# Patient Record
Sex: MALE | Race: OTHER | NOT HISPANIC OR LATINO | URBAN - METROPOLITAN AREA
[De-identification: names, ages, dates, MRNs, and addresses within clinical notes are randomized per-mention and may not be internally consistent; named-entity substitution may affect disease eponyms.]

---

## 2020-10-29 ENCOUNTER — INPATIENT (INPATIENT)
Facility: HOSPITAL | Age: 35
LOS: 0 days | Discharge: HOME | DRG: 343 | End: 2020-10-30
Attending: SURGERY | Admitting: SURGERY
Payer: COMMERCIAL

## 2020-10-29 VITALS
DIASTOLIC BLOOD PRESSURE: 77 MMHG | TEMPERATURE: 100 F | OXYGEN SATURATION: 99 % | SYSTOLIC BLOOD PRESSURE: 120 MMHG | RESPIRATION RATE: 18 BRPM | HEIGHT: 72.44 IN | WEIGHT: 216.05 LBS | HEART RATE: 108 BPM

## 2020-10-29 LAB
ALBUMIN SERPL ELPH-MCNC: 4 G/DL — SIGNIFICANT CHANGE UP (ref 3.3–5)
ALP SERPL-CCNC: 69 U/L — SIGNIFICANT CHANGE UP (ref 40–120)
ALT FLD-CCNC: 25 U/L — SIGNIFICANT CHANGE UP (ref 10–45)
ANION GAP SERPL CALC-SCNC: 12 MMOL/L — SIGNIFICANT CHANGE UP (ref 5–17)
APPEARANCE UR: CLEAR — SIGNIFICANT CHANGE UP
APTT BLD: 35.3 SEC — SIGNIFICANT CHANGE UP (ref 27.5–35.5)
AST SERPL-CCNC: 21 U/L — SIGNIFICANT CHANGE UP (ref 10–40)
BACTERIA # UR AUTO: PRESENT /HPF
BASOPHILS # BLD AUTO: 0.06 K/UL — SIGNIFICANT CHANGE UP (ref 0–0.2)
BASOPHILS NFR BLD AUTO: 0.4 % — SIGNIFICANT CHANGE UP (ref 0–2)
BILIRUB SERPL-MCNC: 1 MG/DL — SIGNIFICANT CHANGE UP (ref 0.2–1.2)
BILIRUB UR-MCNC: NEGATIVE — SIGNIFICANT CHANGE UP
BLD GP AB SCN SERPL QL: NEGATIVE — SIGNIFICANT CHANGE UP
BLD GP AB SCN SERPL QL: NEGATIVE — SIGNIFICANT CHANGE UP
BUN SERPL-MCNC: 18 MG/DL — SIGNIFICANT CHANGE UP (ref 7–23)
CALCIUM SERPL-MCNC: 9.1 MG/DL — SIGNIFICANT CHANGE UP (ref 8.4–10.5)
CHLORIDE SERPL-SCNC: 99 MMOL/L — SIGNIFICANT CHANGE UP (ref 96–108)
CO2 SERPL-SCNC: 25 MMOL/L — SIGNIFICANT CHANGE UP (ref 22–31)
COLOR SPEC: YELLOW — SIGNIFICANT CHANGE UP
COMMENT - URINE: SIGNIFICANT CHANGE UP
CREAT SERPL-MCNC: 0.91 MG/DL — SIGNIFICANT CHANGE UP (ref 0.5–1.3)
DIFF PNL FLD: ABNORMAL
EOSINOPHIL # BLD AUTO: 0.12 K/UL — SIGNIFICANT CHANGE UP (ref 0–0.5)
EOSINOPHIL NFR BLD AUTO: 0.7 % — SIGNIFICANT CHANGE UP (ref 0–6)
EPI CELLS # UR: SIGNIFICANT CHANGE UP /HPF (ref 0–5)
GLUCOSE SERPL-MCNC: 106 MG/DL — HIGH (ref 70–99)
GLUCOSE UR QL: NEGATIVE — SIGNIFICANT CHANGE UP
HCT VFR BLD CALC: 49.6 % — SIGNIFICANT CHANGE UP (ref 39–50)
HGB BLD-MCNC: 16.6 G/DL — SIGNIFICANT CHANGE UP (ref 13–17)
IMM GRANULOCYTES NFR BLD AUTO: 0.6 % — SIGNIFICANT CHANGE UP (ref 0–1.5)
INR BLD: 1.24 — HIGH (ref 0.88–1.16)
KETONES UR-MCNC: NEGATIVE — SIGNIFICANT CHANGE UP
LACTATE SERPL-SCNC: 0.9 MMOL/L — SIGNIFICANT CHANGE UP (ref 0.5–2)
LEUKOCYTE ESTERASE UR-ACNC: NEGATIVE — SIGNIFICANT CHANGE UP
LIDOCAIN IGE QN: 75 U/L — HIGH (ref 7–60)
LYMPHOCYTES # BLD AUTO: 1.93 K/UL — SIGNIFICANT CHANGE UP (ref 1–3.3)
LYMPHOCYTES # BLD AUTO: 11.9 % — LOW (ref 13–44)
MCHC RBC-ENTMCNC: 29.2 PG — SIGNIFICANT CHANGE UP (ref 27–34)
MCHC RBC-ENTMCNC: 33.5 GM/DL — SIGNIFICANT CHANGE UP (ref 32–36)
MCV RBC AUTO: 87.2 FL — SIGNIFICANT CHANGE UP (ref 80–100)
MONOCYTES # BLD AUTO: 1.36 K/UL — HIGH (ref 0–0.9)
MONOCYTES NFR BLD AUTO: 8.4 % — SIGNIFICANT CHANGE UP (ref 2–14)
NEUTROPHILS # BLD AUTO: 12.66 K/UL — HIGH (ref 1.8–7.4)
NEUTROPHILS NFR BLD AUTO: 78 % — HIGH (ref 43–77)
NITRITE UR-MCNC: NEGATIVE — SIGNIFICANT CHANGE UP
NRBC # BLD: 0 /100 WBCS — SIGNIFICANT CHANGE UP (ref 0–0)
PH UR: 6 — SIGNIFICANT CHANGE UP (ref 5–8)
PLATELET # BLD AUTO: 229 K/UL — SIGNIFICANT CHANGE UP (ref 150–400)
POTASSIUM SERPL-MCNC: 4.2 MMOL/L — SIGNIFICANT CHANGE UP (ref 3.5–5.3)
POTASSIUM SERPL-SCNC: 4.2 MMOL/L — SIGNIFICANT CHANGE UP (ref 3.5–5.3)
PROT SERPL-MCNC: 7.5 G/DL — SIGNIFICANT CHANGE UP (ref 6–8.3)
PROT UR-MCNC: NEGATIVE MG/DL — SIGNIFICANT CHANGE UP
PROTHROM AB SERPL-ACNC: 14.7 SEC — HIGH (ref 10.6–13.6)
RBC # BLD: 5.69 M/UL — SIGNIFICANT CHANGE UP (ref 4.2–5.8)
RBC # FLD: 12.6 % — SIGNIFICANT CHANGE UP (ref 10.3–14.5)
RBC CASTS # UR COMP ASSIST: < 5 /HPF — SIGNIFICANT CHANGE UP
RH IG SCN BLD-IMP: POSITIVE — SIGNIFICANT CHANGE UP
RH IG SCN BLD-IMP: POSITIVE — SIGNIFICANT CHANGE UP
SARS-COV-2 RNA SPEC QL NAA+PROBE: SIGNIFICANT CHANGE UP
SODIUM SERPL-SCNC: 136 MMOL/L — SIGNIFICANT CHANGE UP (ref 135–145)
SP GR SPEC: >=1.03 — SIGNIFICANT CHANGE UP (ref 1–1.03)
UROBILINOGEN FLD QL: 0.2 E.U./DL — SIGNIFICANT CHANGE UP
WBC # BLD: 16.22 K/UL — HIGH (ref 3.8–10.5)
WBC # FLD AUTO: 16.22 K/UL — HIGH (ref 3.8–10.5)
WBC UR QL: < 5 /HPF — SIGNIFICANT CHANGE UP

## 2020-10-29 PROCEDURE — 74177 CT ABD & PELVIS W/CONTRAST: CPT | Mod: 26

## 2020-10-29 PROCEDURE — 88304 TISSUE EXAM BY PATHOLOGIST: CPT | Mod: 26

## 2020-10-29 PROCEDURE — 99285 EMERGENCY DEPT VISIT HI MDM: CPT

## 2020-10-29 PROCEDURE — 93010 ELECTROCARDIOGRAM REPORT: CPT

## 2020-10-29 RX ORDER — SODIUM CHLORIDE 9 MG/ML
1000 INJECTION INTRAMUSCULAR; INTRAVENOUS; SUBCUTANEOUS ONCE
Refills: 0 | Status: COMPLETED | OUTPATIENT
Start: 2020-10-29 | End: 2020-10-29

## 2020-10-29 RX ORDER — ONDANSETRON 8 MG/1
4 TABLET, FILM COATED ORAL ONCE
Refills: 0 | Status: COMPLETED | OUTPATIENT
Start: 2020-10-29 | End: 2020-10-29

## 2020-10-29 RX ORDER — HYDROMORPHONE HYDROCHLORIDE 2 MG/ML
0.5 INJECTION INTRAMUSCULAR; INTRAVENOUS; SUBCUTANEOUS EVERY 4 HOURS
Refills: 0 | Status: DISCONTINUED | OUTPATIENT
Start: 2020-10-29 | End: 2020-10-29

## 2020-10-29 RX ORDER — METRONIDAZOLE 500 MG
500 TABLET ORAL EVERY 8 HOURS
Refills: 0 | Status: CANCELLED | OUTPATIENT
Start: 2020-10-30 | End: 2020-10-29

## 2020-10-29 RX ORDER — MORPHINE SULFATE 50 MG/1
4 CAPSULE, EXTENDED RELEASE ORAL ONCE
Refills: 0 | Status: DISCONTINUED | OUTPATIENT
Start: 2020-10-29 | End: 2020-10-29

## 2020-10-29 RX ORDER — CEFTRIAXONE 500 MG/1
1000 INJECTION, POWDER, FOR SOLUTION INTRAMUSCULAR; INTRAVENOUS EVERY 24 HOURS
Refills: 0 | Status: CANCELLED | OUTPATIENT
Start: 2020-10-30 | End: 2020-10-29

## 2020-10-29 RX ORDER — ONDANSETRON 8 MG/1
4 TABLET, FILM COATED ORAL EVERY 6 HOURS
Refills: 0 | Status: DISCONTINUED | OUTPATIENT
Start: 2020-10-29 | End: 2020-10-29

## 2020-10-29 RX ORDER — HYDROMORPHONE HYDROCHLORIDE 2 MG/ML
0.25 INJECTION INTRAMUSCULAR; INTRAVENOUS; SUBCUTANEOUS EVERY 4 HOURS
Refills: 0 | Status: DISCONTINUED | OUTPATIENT
Start: 2020-10-29 | End: 2020-10-29

## 2020-10-29 RX ORDER — SODIUM CHLORIDE 9 MG/ML
1000 INJECTION, SOLUTION INTRAVENOUS
Refills: 0 | Status: DISCONTINUED | OUTPATIENT
Start: 2020-10-29 | End: 2020-10-29

## 2020-10-29 RX ORDER — IOHEXOL 300 MG/ML
30 INJECTION, SOLUTION INTRAVENOUS ONCE
Refills: 0 | Status: COMPLETED | OUTPATIENT
Start: 2020-10-29 | End: 2020-10-29

## 2020-10-29 RX ORDER — HEPARIN SODIUM 5000 [USP'U]/ML
5000 INJECTION INTRAVENOUS; SUBCUTANEOUS EVERY 8 HOURS
Refills: 0 | Status: CANCELLED | OUTPATIENT
Start: 2020-10-30 | End: 2020-10-29

## 2020-10-29 RX ORDER — PIPERACILLIN AND TAZOBACTAM 4; .5 G/20ML; G/20ML
3.38 INJECTION, POWDER, LYOPHILIZED, FOR SOLUTION INTRAVENOUS ONCE
Refills: 0 | Status: COMPLETED | OUTPATIENT
Start: 2020-10-29 | End: 2020-10-29

## 2020-10-29 RX ADMIN — PIPERACILLIN AND TAZOBACTAM 200 GRAM(S): 4; .5 INJECTION, POWDER, LYOPHILIZED, FOR SOLUTION INTRAVENOUS at 19:19

## 2020-10-29 RX ADMIN — ONDANSETRON 4 MILLIGRAM(S): 8 TABLET, FILM COATED ORAL at 18:34

## 2020-10-29 RX ADMIN — SODIUM CHLORIDE 2000 MILLILITER(S): 9 INJECTION INTRAMUSCULAR; INTRAVENOUS; SUBCUTANEOUS at 18:34

## 2020-10-29 RX ADMIN — MORPHINE SULFATE 4 MILLIGRAM(S): 50 CAPSULE, EXTENDED RELEASE ORAL at 19:30

## 2020-10-29 RX ADMIN — SODIUM CHLORIDE 1000 MILLILITER(S): 9 INJECTION INTRAMUSCULAR; INTRAVENOUS; SUBCUTANEOUS at 20:42

## 2020-10-29 RX ADMIN — IOHEXOL 30 MILLILITER(S): 300 INJECTION, SOLUTION INTRAVENOUS at 18:34

## 2020-10-29 RX ADMIN — MORPHINE SULFATE 4 MILLIGRAM(S): 50 CAPSULE, EXTENDED RELEASE ORAL at 18:33

## 2020-10-29 NOTE — H&P ADULT - ATTENDING COMMENTS
Patient seen and examined, CT reviewed.  Appendicitis.  OR for lap/open appendectomy. Risks reviewed. Questions answered. He agrees to proceed.  Received Zosyn in ED

## 2020-10-29 NOTE — H&P ADULT - NSHPLABSRESULTS_GEN_ALL_CORE
10-29    136  |  99  |  18  ----------------------------<  106<H>  4.2   |  25  |  0.91    Ca    9.1      29 Oct 2020 18:24    TPro  7.5  /  Alb  4.0  /  TBili  1.0  /  DBili  x   /  AST  21  /  ALT  25  /  AlkPhos  69  10-29                        16.6   16.22 )-----------( 229      ( 29 Oct 2020 18:24 )             49.6   CT Abdomen and Pelvis w/ Oral Cont and w/ IV Cont:   ******PRELIMINARY REPORT******    ******PRELIMINARY REPORT******            EXAM:  CT ABDOMEN AND PELVIS OC IC                          PROCEDURE DATE:  10/29/2020    ******PRELIMINARY REPORT******    ******PRELIMINARY REPORT******              INTERPRETATION:  CT of the ABDOMEN and PELVIS with intravenous contrast dated 10/29/2020 8:17 PM    INDICATION: Right lower quadrant abdominal pain. Rule out appendicitis.    TECHNIQUE: CT of the abdomen and pelvis with intravenous and oral contrast. Axial, sagittal, and coronal images were obtained and reviewed. 95 cc Optiray 350 intravenous contrast was administered; 5 cc was discarded.    COMPARISON: None.    FINDINGS:    Lower chest: Mild bibasilar atelectasis.    Liver: Smooth contour. No focal lesion.    Biliary system: Gallbladder is normal in size. No calcified gallstones. No biliary ductal dilatation.    Pancreas: Unremarkable.    Spleen: Unremarkable.    Adrenal glands: Unremarkable.    Kidneys: Symmetric parenchymal enhancement. No renal mass. No hydronephrosis. No renal or ureteral stone.    Bowel/Peritoneum: Enteric contrast reaches the level of the cecum. The appendix is dilated measuring 1.5 with prominent periappendiceal and pericecal fat stranding. An appendicolith measuring 4 mm is present within the proximal appendix. No signs of perforation. There is a 2.3 x 2.3 cm fluid collection with a few foci of air within the cecum adjacent to the base of the appendix, which could represent a contained intraluminal abscess. Normal bowel caliber without evidence of obstruction. No appreciable wall thickening. Normal appendix. No extraluminal gas or ascites. Small fat-containing umbilical hernia.    Pelvic organs: Unremarkable.    Lymph nodes: No lymphadenopathy.    Vascular: Normal caliber aorta.    Bones/Soft tissues: There is fusion of the T10 and T11 vertebral bodies.      IMPRESSION:    Acute appendicitis. 2.3 cm air-fluid collection within the cecum near the base of the appendix could represent a contained intraluminal abscess. No extraluminal gas visualized to suggest perforation.

## 2020-10-29 NOTE — ED ADULT NURSE REASSESSMENT NOTE - NS ED NURSE REASSESS COMMENT FT1
Report given to surgery by SARAH Ruvalcaba.  GI Surgery at bedside with telephonic  for consent and evaluation.  Pt is alert and oriented x3.

## 2020-10-29 NOTE — H&P ADULT - NSHPPHYSICALEXAM_GEN_ALL_CORE
T(C): 37.2 (10-29-20 @ 19:20), Max: 37.9 (10-29-20 @ 17:46)  HR: 93 (10-29-20 @ 19:20) (93 - 108)  BP: 123/86 (10-29-20 @ 19:20) (120/77 - 123/86)  RR: 18 (10-29-20 @ 19:20) (18 - 18)  SpO2: 99% (10-29-20 @ 19:20) (99% - 99%)    GENERAL: NAD, Resting comfortably in bed, awake, opens eyes spontaneously  HEENT: NCAT, MMM, Normal conjunctiva, PERRL  RESP: Nonlabored breathing, No respiratory distress  CARD: Normal rate, Normal peripheral perfusion  GI: Soft, moderately distended, mod TTP RLQ, -Rovsing, No guarding, No rebound tenderness  EXTREM: WWP, No edema, No gross deformity of extremities  SKIN: No rashes, no lesions  NEURO: AAOx3, No focal motor or sensory deficits  PSYCH: Affect and characteristics of appearance, verbalizations, and behaviors are appropriate

## 2020-10-29 NOTE — ED PROVIDER NOTE - CHPI ED SYMPTOMS NEG
no vomiting/no URI symptoms, no back pain, no change in bowel movement/no fever/no nausea/no chills/no diarrhea

## 2020-10-29 NOTE — H&P ADULT - HISTORY OF PRESENT ILLNESS
35M (Kiswahili speaking) no PMH/PSHx (does not have PCP) presents with abd pain x 3 days. Pt describes pain as sudden onset, progressively worsening, constant, sharp, severe, located in RLQ, non-radiating, no migration, moderately improved s/p 4 mg morphine in ED. Patient did not take OTC pain meds prior to arrival, and decided to come to ED given persistence of pain. Patient denies associated symptoms, including recent fevers/chills, nausea/vomiting, PO intolerance, diarrhea, constipation, hematochezia/melena, CP, SOB, dysuria, sick contacts. Last BM this AM was normal in color/consistency. Last meal was at noon (chocolate arepa), tolerated well. Pt has no family history of IBD or GI malignancy, and has never had a colonoscopy. In ED afebrile, tachycardic 108, /77, O2 99% on RA. WBC 16.2 (79% PMNs), INR 1.24, lactate 0.9, lipase 75, UA neg. CT revealed acute appendicitis dilated 1.5 cm with prominent periappendiceal and pericecal fat stranding, 4mm appendicolith, and no signs of perforation. In ED given 4 mg morphine, zosyn, 1L NS.

## 2020-10-29 NOTE — ED PROVIDER NOTE - CLINICAL SUMMARY MEDICAL DECISION MAKING FREE TEXT BOX
5 y/o M, Palestinian speaking, no PMHx, here with right lower abdominal pain x 3 days. Pain is non-radiating. States pain is progressively getting worse. During exam, tenderness to palpation of RLQ, and CVA tenderness. Will order labs. Differentials include kidney stone vs appendicitis. 5 y/o M, Chilean speaking, no PMHx, here with right lower abdominal pain x 3 days. Pain is non-radiating. States pain is progressively getting worse. During exam, tenderness to palpation of RLQ, and CVA tenderness. Will order labs. Differentials include kidney stone vs appendicitis.  Labs, and CT scan pending. pt hemodynamically stable.

## 2020-10-29 NOTE — ED ADULT NURSE NOTE - OBJECTIVE STATEMENT
PT came to ED complaining of RLQ pain radiating to right back x3 days. Pt denies D/N/V, fever, chills, diarrhea, melena, hematuria. Pt denies all other medical complaints at this time. Alert and oriented x3. Pt communicating in English and with Thai speaking staff.

## 2020-10-29 NOTE — ED PROVIDER NOTE - OBJECTIVE STATEMENT
36 y/o M, South Korean speaking, no PMHx, presents to the ED complaining of right lower abdominal pain x 3 days. Pain is non-radiating. States pain is progressively getting worse. Pain was 4/5, but now feels pain is 10/10. Is not taking anything for pain management. States pain feels like a "twisting sensation". Denies fever, chills, n/v/d, back pain, URI symptoms, change in bowel movement. Denies history of abdominal surgeries or kidney stones.

## 2020-10-29 NOTE — ED PROVIDER NOTE - ATTENDING CONTRIBUTION TO CARE
35 healthy M 3 days RLQ pain, worsening.  + TTP, slightly tachy, + rebound, no guarding.  No testicular pain or ttp.  Plan labs, CT and tx likely appendicitis.  Do not suspect torsion.

## 2020-10-29 NOTE — ED ADULT NURSE NOTE - CHPI ED NUR SYMPTOMS NEG
no dysuria/no fever/no burning urination/no diarrhea/no blood in stool/no chills/no hematuria/no nausea/no vomiting/no abdominal distension

## 2020-10-30 VITALS
OXYGEN SATURATION: 98 % | SYSTOLIC BLOOD PRESSURE: 123 MMHG | RESPIRATION RATE: 17 BRPM | TEMPERATURE: 97 F | DIASTOLIC BLOOD PRESSURE: 76 MMHG | HEART RATE: 62 BPM

## 2020-10-30 LAB
ANION GAP SERPL CALC-SCNC: 11 MMOL/L — SIGNIFICANT CHANGE UP (ref 5–17)
BUN SERPL-MCNC: 18 MG/DL — SIGNIFICANT CHANGE UP (ref 7–23)
CALCIUM SERPL-MCNC: 8.5 MG/DL — SIGNIFICANT CHANGE UP (ref 8.4–10.5)
CHLORIDE SERPL-SCNC: 103 MMOL/L — SIGNIFICANT CHANGE UP (ref 96–108)
CO2 SERPL-SCNC: 23 MMOL/L — SIGNIFICANT CHANGE UP (ref 22–31)
CREAT SERPL-MCNC: 1 MG/DL — SIGNIFICANT CHANGE UP (ref 0.5–1.3)
GLUCOSE SERPL-MCNC: 135 MG/DL — HIGH (ref 70–99)
HCT VFR BLD CALC: 45.1 % — SIGNIFICANT CHANGE UP (ref 39–50)
HGB BLD-MCNC: 15.5 G/DL — SIGNIFICANT CHANGE UP (ref 13–17)
MAGNESIUM SERPL-MCNC: 2.2 MG/DL — SIGNIFICANT CHANGE UP (ref 1.6–2.6)
MCHC RBC-ENTMCNC: 29.9 PG — SIGNIFICANT CHANGE UP (ref 27–34)
MCHC RBC-ENTMCNC: 34.4 GM/DL — SIGNIFICANT CHANGE UP (ref 32–36)
MCV RBC AUTO: 86.9 FL — SIGNIFICANT CHANGE UP (ref 80–100)
NRBC # BLD: 0 /100 WBCS — SIGNIFICANT CHANGE UP (ref 0–0)
PHOSPHATE SERPL-MCNC: 3.7 MG/DL — SIGNIFICANT CHANGE UP (ref 2.5–4.5)
PLATELET # BLD AUTO: 201 K/UL — SIGNIFICANT CHANGE UP (ref 150–400)
POTASSIUM SERPL-MCNC: 4.7 MMOL/L — SIGNIFICANT CHANGE UP (ref 3.5–5.3)
POTASSIUM SERPL-SCNC: 4.7 MMOL/L — SIGNIFICANT CHANGE UP (ref 3.5–5.3)
RBC # BLD: 5.19 M/UL — SIGNIFICANT CHANGE UP (ref 4.2–5.8)
RBC # FLD: 12.6 % — SIGNIFICANT CHANGE UP (ref 10.3–14.5)
SODIUM SERPL-SCNC: 137 MMOL/L — SIGNIFICANT CHANGE UP (ref 135–145)
WBC # BLD: 11.72 K/UL — HIGH (ref 3.8–10.5)
WBC # FLD AUTO: 11.72 K/UL — HIGH (ref 3.8–10.5)

## 2020-10-30 PROCEDURE — 81001 URINALYSIS AUTO W/SCOPE: CPT

## 2020-10-30 PROCEDURE — 83735 ASSAY OF MAGNESIUM: CPT

## 2020-10-30 PROCEDURE — 83690 ASSAY OF LIPASE: CPT

## 2020-10-30 PROCEDURE — 84100 ASSAY OF PHOSPHORUS: CPT

## 2020-10-30 PROCEDURE — 86850 RBC ANTIBODY SCREEN: CPT

## 2020-10-30 PROCEDURE — 96374 THER/PROPH/DIAG INJ IV PUSH: CPT | Mod: XU

## 2020-10-30 PROCEDURE — 87040 BLOOD CULTURE FOR BACTERIA: CPT

## 2020-10-30 PROCEDURE — 80053 COMPREHEN METABOLIC PANEL: CPT

## 2020-10-30 PROCEDURE — 88304 TISSUE EXAM BY PATHOLOGIST: CPT

## 2020-10-30 PROCEDURE — 87635 SARS-COV-2 COVID-19 AMP PRB: CPT

## 2020-10-30 PROCEDURE — 36415 COLL VENOUS BLD VENIPUNCTURE: CPT

## 2020-10-30 PROCEDURE — 85610 PROTHROMBIN TIME: CPT

## 2020-10-30 PROCEDURE — 96375 TX/PRO/DX INJ NEW DRUG ADDON: CPT

## 2020-10-30 PROCEDURE — 85730 THROMBOPLASTIN TIME PARTIAL: CPT

## 2020-10-30 PROCEDURE — 86900 BLOOD TYPING SEROLOGIC ABO: CPT

## 2020-10-30 PROCEDURE — 85027 COMPLETE CBC AUTOMATED: CPT

## 2020-10-30 PROCEDURE — 93005 ELECTROCARDIOGRAM TRACING: CPT

## 2020-10-30 PROCEDURE — 80048 BASIC METABOLIC PNL TOTAL CA: CPT

## 2020-10-30 PROCEDURE — 86901 BLOOD TYPING SEROLOGIC RH(D): CPT

## 2020-10-30 PROCEDURE — 85025 COMPLETE CBC W/AUTO DIFF WBC: CPT

## 2020-10-30 PROCEDURE — 83605 ASSAY OF LACTIC ACID: CPT

## 2020-10-30 PROCEDURE — 74177 CT ABD & PELVIS W/CONTRAST: CPT

## 2020-10-30 PROCEDURE — 99285 EMERGENCY DEPT VISIT HI MDM: CPT | Mod: 25

## 2020-10-30 RX ORDER — ONDANSETRON 8 MG/1
4 TABLET, FILM COATED ORAL EVERY 6 HOURS
Refills: 0 | Status: DISCONTINUED | OUTPATIENT
Start: 2020-10-30 | End: 2020-10-30

## 2020-10-30 RX ORDER — HYDROMORPHONE HYDROCHLORIDE 2 MG/ML
0.5 INJECTION INTRAMUSCULAR; INTRAVENOUS; SUBCUTANEOUS
Refills: 0 | Status: DISCONTINUED | OUTPATIENT
Start: 2020-10-30 | End: 2020-10-30

## 2020-10-30 RX ORDER — METOCLOPRAMIDE HCL 10 MG
10 TABLET ORAL EVERY 6 HOURS
Refills: 0 | Status: DISCONTINUED | OUTPATIENT
Start: 2020-10-30 | End: 2020-10-30

## 2020-10-30 RX ORDER — OXYCODONE HYDROCHLORIDE 5 MG/1
2.5 TABLET ORAL EVERY 4 HOURS
Refills: 0 | Status: DISCONTINUED | OUTPATIENT
Start: 2020-10-30 | End: 2020-10-30

## 2020-10-30 RX ORDER — OXYCODONE HYDROCHLORIDE 5 MG/1
5 TABLET ORAL EVERY 4 HOURS
Refills: 0 | Status: DISCONTINUED | OUTPATIENT
Start: 2020-10-30 | End: 2020-10-30

## 2020-10-30 RX ORDER — KETOROLAC TROMETHAMINE 30 MG/ML
15 SYRINGE (ML) INJECTION EVERY 6 HOURS
Refills: 0 | Status: DISCONTINUED | OUTPATIENT
Start: 2020-10-30 | End: 2020-10-30

## 2020-10-30 RX ORDER — DOCUSATE SODIUM 100 MG
1 CAPSULE ORAL
Qty: 14 | Refills: 0
Start: 2020-10-30 | End: 2020-11-05

## 2020-10-30 RX ORDER — ACETAMINOPHEN 500 MG
650 TABLET ORAL EVERY 4 HOURS
Refills: 0 | Status: DISCONTINUED | OUTPATIENT
Start: 2020-10-30 | End: 2020-10-30

## 2020-10-30 RX ORDER — SODIUM CHLORIDE 9 MG/ML
1000 INJECTION, SOLUTION INTRAVENOUS
Refills: 0 | Status: DISCONTINUED | OUTPATIENT
Start: 2020-10-30 | End: 2020-10-30

## 2020-10-30 RX ADMIN — SODIUM CHLORIDE 100 MILLILITER(S): 9 INJECTION, SOLUTION INTRAVENOUS at 01:31

## 2020-10-30 RX ADMIN — Medication 650 MILLIGRAM(S): at 03:00

## 2020-10-30 RX ADMIN — OXYCODONE HYDROCHLORIDE 2.5 MILLIGRAM(S): 5 TABLET ORAL at 03:00

## 2020-10-30 RX ADMIN — Medication 650 MILLIGRAM(S): at 06:18

## 2020-10-30 RX ADMIN — Medication 15 MILLIGRAM(S): at 05:22

## 2020-10-30 RX ADMIN — Medication 650 MILLIGRAM(S): at 05:22

## 2020-10-30 RX ADMIN — Medication 650 MILLIGRAM(S): at 02:17

## 2020-10-30 RX ADMIN — OXYCODONE HYDROCHLORIDE 2.5 MILLIGRAM(S): 5 TABLET ORAL at 02:27

## 2020-10-30 RX ADMIN — Medication 15 MILLIGRAM(S): at 06:19

## 2020-10-30 NOTE — PROGRESS NOTE ADULT - SUBJECTIVE AND OBJECTIVE BOX
24 hr events:  10/29: Underwent lap appy, nonperf nongangrenous. POC normal.     SUBJECTIVE:  Pt seen and examined by chief resident. Pt is doing well, resting comfortably on bed. Reporting some abdominal soreness, but pain controlled. Tolerating sips of water, hasnt eaten eaten anything more since the surgery. Feeling very tiered. No nausea or vomiting. No complaints at this time.    Vital Signs Last 24 Hrs  T(C): 36.4 (30 Oct 2020 04:11), Max: 37.9 (29 Oct 2020 17:46)  T(F): 97.6 (30 Oct 2020 04:11), Max: 100.3 (29 Oct 2020 17:46)  HR: 87 (30 Oct 2020 04:11) (80 - 108)  BP: 105/64 (30 Oct 2020 04:11) (105/64 - 126/81)  BP(mean): 82 (30 Oct 2020 02:40) (82 - 98)  RR: 16 (30 Oct 2020 04:11) (14 - 22)  SpO2: 95% (30 Oct 2020 04:11) (93% - 99%)    Physical Exam:  General: NAD  Pulmonary: Nonlabored breathing, no respiratory distress  Abdominal: soft, NT/ND, incisions clean dry and intact.   Extremities: WWP, SCDs in place    I&O's Summary    29 Oct 2020 07:01  -  30 Oct 2020 07:00  --------------------------------------------------------  IN: 820 mL / OUT: 0 mL / NET: 820 mL        LABS:                        15.5   11.72 )-----------( 201      ( 30 Oct 2020 06:15 )             45.1     10-30    137  |  103  |  18  ----------------------------<  135<H>  4.7   |  23  |  1.00    Ca    8.5      30 Oct 2020 06:15  Phos  3.7     10-30  Mg     2.2     10-30    TPro  7.5  /  Alb  4.0  /  TBili  1.0  /  DBili  x   /  AST  21  /  ALT  25  /  AlkPhos  69  10-29    PT/INR - ( 29 Oct 2020 18:24 )   PT: 14.7 sec;   INR: 1.24       PTT - ( 29 Oct 2020 18:24 )  PTT:35.3 sec    Urinalysis Basic - ( 29 Oct 2020 18:48 )  Color: Yellow / Appearance: Clear / SG: >=1.030 / pH: x  Gluc: x / Ketone: NEGATIVE  / Bili: Negative / Urobili: 0.2 E.U./dL   Blood: x / Protein: NEGATIVE mg/dL / Nitrite: NEGATIVE   Leuk Esterase: NEGATIVE / RBC: < 5 /HPF / WBC < 5 /HPF   Sq Epi: x / Non Sq Epi: 0-5 /HPF / Bacteria: Present /HPF    LIVER FUNCTIONS - ( 29 Oct 2020 18:24 )  Alb: 4.0 g/dL / Pro: 7.5 g/dL / ALK PHOS: 69 U/L / ALT: 25 U/L / AST: 21 U/L / GGT: x

## 2020-10-30 NOTE — PACU DISCHARGE NOTE - COMMENTS
Discharged from PACU Discharged from PACU after meeting criteria. Report given to 5W SARAH Edmondson. VSS. Relief from pain after receiving Tylenol and oxycodone. RRWNL on room air. Cardiac monitor showing SR. Left AC angio 20g with LR 100cc/h. Abdomen with 3 lap sites with derma bond. Patient tolerated ice chips and sips of water with meds. Due to void. Afebrile. Patient transported on stretcher unmonitored. No belongings with patient.

## 2020-10-30 NOTE — DISCHARGE NOTE NURSING/CASE MANAGEMENT/SOCIAL WORK - PATIENT PORTAL LINK FT
You can access the FollowMyHealth Patient Portal offered by [facility] by registering at the following website: [email or web address]. By joining [facility]’s FollowMyHealth portal, you will also be able to view your health information using other applications (apps) compatible with our system.

## 2020-10-30 NOTE — DISCHARGE NOTE PROVIDER - NSDCMRMEDTOKEN_GEN_ALL_CORE_FT
Colace 100 mg oral capsule: 1 cap(s) orally 2 times a day as needed for constipation caused by percocet  oxycodone-acetaminophen 5 mg-325 mg oral tablet: 1 tab(s) orally every 6 hours as needed for severe painMDD:4

## 2020-10-30 NOTE — BRIEF OPERATIVE NOTE - OPERATION/FINDINGS
Supraumbilical Darci cutdown revealing a small incarcerated fat containing umbilical hernia. Diagnostic laparoscopy revealing an acute nonperforated nongangrenous appendicitis. Mesoappendix ligated using ligasure cautery. Appendix divided at the base using 45mm Purple EndoGIA stapler x 1. Hemostasis achieved at the end of the case. Fascia closed with vicryl and skin with monocryl.

## 2020-10-30 NOTE — DISCHARGE NOTE PROVIDER - CARE PROVIDER_API CALL
Sultana Myers  COLON/RECTAL SURGERY  [address] [place] [address], [address]  [place] [id number]  Phone: [phone]  Fax: [phone]  Follow Up Time:

## 2020-10-30 NOTE — PROGRESS NOTE ADULT - SUBJECTIVE AND OBJECTIVE BOX
Surgery Post-Op Note    Procedure: Laparoscopic appendectomy    Diagnosis/Indication: Acute appendicitis    Surgeon: Dr. Myers    S: Pt has no complaints. Pain well controlled. No nausea or vomiting. Overall is quite comfortable.     O:  T(C): 36.1 (10-30-20 @ 02:40), Max: 36.1 (10-30-20 @ 02:40)  T(F): 96.9 (10-30-20 @ 02:40), Max: 96.9 (10-30-20 @ 02:40)  HR: 85 (10-30-20 @ 02:40) (80 - 95)  BP: 107/67 (10-30-20 @ 02:40) (107/67 - 126/81)  RR: 14 (10-30-20 @ 02:40) (14 - 22)  SpO2: 93% (10-30-20 @ 02:40) (93% - 99%)  Wt(kg): --                        16.6   16.22 )-----------( 229      ( 29 Oct 2020 18:24 )             49.6     10-29    136  |  99  |  18  ----------------------------<  106<H>  4.2   |  25  |  0.91    Ca    9.1      29 Oct 2020 18:24    TPro  7.5  /  Alb  4.0  /  TBili  1.0  /  DBili  x   /  AST  21  /  ALT  25  /  AlkPhos  69  10-29      Gen: NAD, resting comfortably in bed  C/V: NSR  Pulm: Nonlabored breathing, no respiratory distress  Abd: soft, NT/ND  Extrem: WWP, no calf edema, SCDs in place      A/P: 35yMale s/p above procedure  Diet: Regular  IVF: LR @ 100  Pain/nausea control: Tylenol Dilaudid   DVT ppx: Heparin held  Dispo plan: Likely home tomorrow

## 2020-10-30 NOTE — DISCHARGE NOTE PROVIDER - PROVIDER TOKENS
Problem: Venous Thromboembolism (VTW)/Deep Vein Thrombosis (DVT) Prevention:  Goal: Patient will participate in Venous Thrombosis (VTE)/Deep Vein Thrombosis (DVT)Prevention Measures  Intervention: Ensure patient wears graduated elastic stockings (DWAYNE hose) and/or SCDs, if ordered, when in bed or chair (Remove at least once per shift for skin check)  Flowsheets (Taken 6/12/2020 2018)  Mechanical Prophylaxis: SCDs, Sequential Compression Device  SCDs, Sequential Compression Device: On     Problem: Pain Management  Goal: Pain level will decrease to patient's comfort goal  Intervention: Follow pain managment plan developed in collaboration with patient and Interdisciplinary Team  Note: Pain assessed q2 hours      PROVIDER:[TOKEN:[[id number]]]

## 2020-10-30 NOTE — DISCHARGE NOTE PROVIDER - NSDCFUADDINST_GEN_ALL_CORE_FT
General Discharge Instructions:  Please resume all regular home medications unless specifically advised not to take a particular medication. Also, please take any new medications as prescribed. Take 2 tabs tylenol every 6 hours as needed for pain management. You have also been prescribed percocet for pain not controlled by tylenol. Take 2 tabs as prescribed instead of tylenol for severe pain. Take prescribed stool softener (colace) to prevent constipation caused by percocet.  Please get plenty of rest, continue to ambulate several times per day, and drink adequate amounts of fluids. Avoid lifting weights greater than 5-10 lbs until you follow-up with your surgeon, who will instruct you further regarding activity restrictions.  Avoid driving or operating heavy machinery while taking pain medications.  Please follow-up with your surgeon and Primary Care Provider (PCP) as advised.  Incision Care:  *Please call your doctor if you have increased pain, swelling, redness, or drainage from the incision site.  *Avoid swimming and baths until your follow-up appointment.  *You may shower, and wash surgical incisions with a mild soap and warm water. Gently pat the area dry.  *If you have staples, they will be removed at your follow-up appointment.  *If you have steri-strips, they will fall off on their own. Please remove any remaining strips 7-10 days after surgery.    Warning Signs:  Please call your doctor or nurse practitioner if you experience the following:  *You experience new chest pain, pressure, squeezing or tightness.  *New or worsening cough, shortness of breath, or wheeze.  *If you are vomiting and cannot keep down fluids or your medications.  *You are getting dehydrated due to continued vomiting, diarrhea, or other reasons. Signs of dehydration include dry mouth, rapid heartbeat, or feeling dizzy or faint when standing.  *You see blood or dark/black material when you vomit or have a bowel movement.  *You experience burning when you urinate, have blood in your urine, or experience a discharge.  *Your pain is not improving within 8-12 hours or is not gone within 24 hours. Call or return immediately if your pain is getting worse, changes location, or moves to your chest or back.  *You have shaking chills, or fever greater than 101.5 degrees Fahrenheit or 38 degrees Celsius.  *Any change in your symptoms, or any new symptoms that concern you.

## 2020-10-30 NOTE — DISCHARGE NOTE PROVIDER - HOSPITAL COURSE
35M (Sudanese speaking) no PMH/PSHx (does not have PCP) presented with abd pain x 3 days.  In ED afebrile, tachycardic 108, /77, O2 99% on RA. WBC 16.2 (79% PMNs), INR 1.24, lactate 0.9, lipase 75, UA neg. CT revealed acute appendicitis dilated 1.5 cm with prominent periappendiceal and pericecal fat stranding, 4mm appendicolith, and no signs of perforation. In ED given 4 mg morphine, zosyn, 1L NS. On 10/29 pt underwent laparoscopic appendectomy. His postoperative course was unremarkable with advancement of diet, passing trial of void, and pain control. On day of discharge patient was stable to be d/c'd home.

## 2020-11-04 DIAGNOSIS — K35.80 UNSPECIFIED ACUTE APPENDICITIS: ICD-10-CM

## 2020-11-04 LAB
CULTURE RESULTS: SIGNIFICANT CHANGE UP
CULTURE RESULTS: SIGNIFICANT CHANGE UP
SPECIMEN SOURCE: SIGNIFICANT CHANGE UP
SPECIMEN SOURCE: SIGNIFICANT CHANGE UP

## 2020-11-06 LAB — SURGICAL PATHOLOGY STUDY: SIGNIFICANT CHANGE UP
